# Patient Record
Sex: FEMALE | Race: WHITE | NOT HISPANIC OR LATINO
[De-identification: names, ages, dates, MRNs, and addresses within clinical notes are randomized per-mention and may not be internally consistent; named-entity substitution may affect disease eponyms.]

---

## 2022-05-04 ENCOUNTER — APPOINTMENT (OUTPATIENT)
Dept: INTERNAL MEDICINE | Facility: CLINIC | Age: 72
End: 2022-05-04
Payer: MEDICARE

## 2022-05-04 ENCOUNTER — NON-APPOINTMENT (OUTPATIENT)
Age: 72
End: 2022-05-04

## 2022-05-04 VITALS
WEIGHT: 122 LBS | SYSTOLIC BLOOD PRESSURE: 120 MMHG | HEART RATE: 100 BPM | HEIGHT: 63 IN | OXYGEN SATURATION: 97 % | BODY MASS INDEX: 21.62 KG/M2 | DIASTOLIC BLOOD PRESSURE: 70 MMHG | TEMPERATURE: 97.3 F

## 2022-05-04 DIAGNOSIS — Z82.5 FAMILY HISTORY OF ASTHMA AND OTHER CHRONIC LOWER RESPIRATORY DISEASES: ICD-10-CM

## 2022-05-04 DIAGNOSIS — Z00.00 ENCOUNTER FOR GENERAL ADULT MEDICAL EXAMINATION W/OUT ABNORMAL FINDINGS: ICD-10-CM

## 2022-05-04 DIAGNOSIS — Z79.4 TYPE 2 DIABETES MELLITUS WITH DIABETIC MACULAR EDEMA, RESOLVED FOLLOWING TREATMENT, BILATERAL: ICD-10-CM

## 2022-05-04 DIAGNOSIS — Z83.3 FAMILY HISTORY OF DIABETES MELLITUS: ICD-10-CM

## 2022-05-04 DIAGNOSIS — E11.37X3 TYPE 2 DIABETES MELLITUS WITH DIABETIC MACULAR EDEMA, RESOLVED FOLLOWING TREATMENT, BILATERAL: ICD-10-CM

## 2022-05-04 PROCEDURE — G0439: CPT

## 2022-05-04 PROCEDURE — 93000 ELECTROCARDIOGRAM COMPLETE: CPT

## 2022-05-04 NOTE — HEALTH RISK ASSESSMENT
[Very Good] : very good [No falls in past year] : Patient reported no falls in the past year [0] : 2) Feeling down, depressed, or hopeless: Not at all [PHQ-2 Score ___] : PHQ-2 Score [unfilled] [Reviewed and Current] :  reviewed and current [Handling Complex Tasks] : difficulty handling complex tasks [None] : None [With Family] : lives with family [Fully functional (bathing, dressing, toileting, transferring, walking, feeding)] : Fully functional (bathing, dressing, toileting, transferring, walking, feeding) [Fully functional (using the telephone, shopping, preparing meals, housekeeping, doing laundry, using transportation,] : Fully functional and needs no help or supervision to perform IADLs (using the telephone, shopping, preparing meals, housekeeping, doing laundry, using transportation, managing medications and managing finances) [Smoke Detector] : smoke detector [Carbon Monoxide Detector] : carbon monoxide detector [Safety elements used in home] : safety elements used in home [Seat Belt] :  uses seat belt [Sunscreen] : uses sunscreen [Active tobbaco user] : Patient is not a tobacco user [Change in mental status noted] : No change in mental status noted [Language] : denies difficulty with language [Behavior] : denies difficulty with behavior [Learning/Retaining New Information] : denies difficulty learning/retaining new information [Reasoning] : denies difficulty with reasoning [Spatial Ability and Orientation] : denies difficulty with spatial ability and orientation [Reports changes in hearing] : Reports no changes in hearing [Reports changes in vision] : Reports no changes in vision [Reports changes in dental health] : Reports no changes in dental health [Travel to Developing Areas] : does not  travel to developing areas [TB Exposure] : is not being exposed to tuberculosis [Caregiver Concerns] : does not have caregiver concerns [de-identified] : 3 years ago [de-identified] : Never dated [de-identified] : Never dated [de-identified] : Never dated

## 2022-05-04 NOTE — PLAN
[FreeTextEntry1] : 1.  Patient is a diabetic, she is on metformin and Levemir injections from prior physician also she admits that she takes Januvia diabetic teaching given to the patient patient already knows about finger sticks and testing the blood sugar monitoring the blood sugar at home, ordered Contour Next 1 test strips to use it 3 times a day. \par  #2 she is on cholesterol medication atorvastatin patient does not remember the dosage she thinks it might be 10 mg, lipid profile ordered to monitor patient's status of the cholesterol panel.\par #3 she has hyperthyroidism for which she takes methimazole 10 mg a day, she is running shot and prescription called in.  Also sonogram of the thyroid ordered to rule out nodules.\par #4 Endocrinology referral given out\par #5 gastroenterology referral for colonoscopy\par #6 mammogram ordered\par #7 bone density ordered #8 patient encouraged to see a gynecologist for a pelvic examination.\par \par Diet low in simple carbohydrates, rich in fiber, no trans fat and low dietary fat, if possible plant based healthy fats.\par good source of proteins, both plant and animal sources, try to avoid red meats if possible.\par sleep is very important at least 8 hours of restful sleep, if possible afternoon brief siesta, for people above 60 years of age.\par Exercise, both aerobic and weight training.\par Healthy habits like gardening, walking etc, both as exercise and hobby\par wear seat belts,\par Immunizations please pay attention to the age, season etc.\par Avoid drugs, alcohol and refrain from smoking.\par Know your risk factors ,go for screening tests.\par

## 2022-05-04 NOTE — HISTORY OF PRESENT ILLNESS
[FreeTextEntry1] : New patient is here for medication refills  [de-identified] :  Pt feels better at the time of exam, no fever , shaking chills no nausea, no vomiting, no diarrhoea, no constipation, no chest pain, no palpitations.Denies any cough or any other respiratory s/s.Not loosing lot of weight nor gaining excess amount of weight.No muscle aches, no joint pains, no skin rashes.No forgetfulness no difficulty with ADLs, Able to sleep, eat ,function and eliminate well.Not been to an emergency room or hospital in the last one year.

## 2022-06-09 ENCOUNTER — LABORATORY RESULT (OUTPATIENT)
Age: 72
End: 2022-06-09

## 2022-06-09 ENCOUNTER — APPOINTMENT (OUTPATIENT)
Dept: INTERNAL MEDICINE | Facility: CLINIC | Age: 72
End: 2022-06-09
Payer: MEDICARE

## 2022-06-09 VITALS
WEIGHT: 122 LBS | OXYGEN SATURATION: 97 % | TEMPERATURE: 97.1 F | HEART RATE: 108 BPM | HEIGHT: 63 IN | SYSTOLIC BLOOD PRESSURE: 100 MMHG | DIASTOLIC BLOOD PRESSURE: 60 MMHG | BODY MASS INDEX: 21.62 KG/M2

## 2022-06-09 PROCEDURE — 99213 OFFICE O/P EST LOW 20 MIN: CPT

## 2022-06-09 NOTE — HISTORY OF PRESENT ILLNESS
[FreeTextEntry8] : pt has uti, left knee pain, difficulty walking urinary symptoms started a week or 10 days ago progressively getting worse patient had UTI before and this feels like may be a UTI.

## 2022-06-09 NOTE — PLAN
[FreeTextEntry1] : Drink plenty of PO liquids,\par take the antibiotic as prescribed, do not miss the doses and make it all gone/finish the entire dose with out failure.\par Take azo , OTC its only symptomatic management, not treatment. As the s/s improve the Azo can be discontinued.\par Tylenol for the fever as needed\par Advil on a full stomach for back pain or general body aches.If the back pain is persistent or fever is persistent please call the office.\par Life style modification after the coitus suggested, to increase the general hygiene\par Maintain a dairy of UTIs, if frequent incidence of UTIs, may consider the antibiotic prophylaxis\par

## 2022-06-10 ENCOUNTER — LABORATORY RESULT (OUTPATIENT)
Age: 72
End: 2022-06-10

## 2022-06-10 LAB
APPEARANCE: ABNORMAL
BILIRUBIN URINE: NEGATIVE
BLOOD URINE: NORMAL
COLOR: YELLOW
GLUCOSE QUALITATIVE U: NEGATIVE
KETONES URINE: ABNORMAL
LEUKOCYTE ESTERASE URINE: ABNORMAL
NITRITE URINE: POSITIVE
PH URINE: 6
PROTEIN URINE: NORMAL
SPECIFIC GRAVITY URINE: >=1.03
UROBILINOGEN URINE: NORMAL

## 2022-07-11 ENCOUNTER — APPOINTMENT (OUTPATIENT)
Dept: PHYSICAL MEDICINE AND REHAB | Facility: CLINIC | Age: 72
End: 2022-07-11

## 2022-07-14 ENCOUNTER — APPOINTMENT (OUTPATIENT)
Dept: PHYSICAL MEDICINE AND REHAB | Facility: CLINIC | Age: 72
End: 2022-07-14

## 2022-07-14 NOTE — PHYSICAL EXAM
[FreeTextEntry1] : PE:\par Constitutional: In NAD, calm and cooperative\par MSK (Knees):\par 	Inspection: no gross swelling identified\par 	ROM: Pain at end knee flexion\par 	Palpation: Tenderness over medial and lateral joint lines\par 	Strength: 5/5 in bilateral lower extremities\par 	Reflexes: 2+ Patella reflex bilaterally, 1+ Achilles reflex bilaterally, negative clonus bilaterally\par 	Sensation: Intact to light touch in bilateral lower extremities\par Special tests: \par Valgus stress test:\par Varus stress test:\par Lachman’s test: \par Anterior drawer test:\par Posterior drawer test:\par Patella grinding test:

## 2022-07-14 NOTE — HISTORY OF PRESENT ILLNESS
[FreeTextEntry1] : Ms. RAFY ROMANO is a 72 year year old female who presents with knee pain. \par \par Location:L knee\par Onset:\par Provocation/Palliative:\par Quality:\par Radiation:\par Severity:\par Timing:\par \par Denies any associated numbness. Admits to some instability but denies any recent falls. \par Previous medications trialed:\par Previous procedures relevant to complaint:\par Has tried conservative therapy?:\par

## 2022-07-21 ENCOUNTER — APPOINTMENT (OUTPATIENT)
Dept: ENDOCRINOLOGY | Facility: CLINIC | Age: 72
End: 2022-07-21

## 2022-07-21 VITALS
HEIGHT: 63 IN | RESPIRATION RATE: 16 BRPM | WEIGHT: 120 LBS | TEMPERATURE: 98 F | DIASTOLIC BLOOD PRESSURE: 72 MMHG | SYSTOLIC BLOOD PRESSURE: 116 MMHG | BODY MASS INDEX: 21.26 KG/M2 | OXYGEN SATURATION: 99 % | HEART RATE: 102 BPM

## 2022-07-21 LAB — HBA1C MFR BLD HPLC: 6.8

## 2022-07-21 PROCEDURE — 99205 OFFICE O/P NEW HI 60 MIN: CPT | Mod: 25

## 2022-07-21 PROCEDURE — 83036 HEMOGLOBIN GLYCOSYLATED A1C: CPT | Mod: QW

## 2022-07-21 PROCEDURE — 36415 COLL VENOUS BLD VENIPUNCTURE: CPT

## 2022-07-21 RX ORDER — CIPROFLOXACIN HYDROCHLORIDE 500 MG/1
500 TABLET, FILM COATED ORAL TWICE DAILY
Qty: 14 | Refills: 0 | Status: COMPLETED | COMMUNITY
Start: 2022-06-09 | End: 2022-07-21

## 2022-07-21 RX ORDER — SITAGLIPTIN 100 MG/1
100 TABLET, FILM COATED ORAL
Refills: 0 | Status: COMPLETED | COMMUNITY
End: 2022-07-21

## 2022-07-21 RX ORDER — PHENAZOPYRIDINE 100 MG/1
100 TABLET, FILM COATED ORAL 3 TIMES DAILY
Qty: 30 | Refills: 0 | Status: COMPLETED | COMMUNITY
Start: 2022-06-09 | End: 2022-07-21

## 2022-07-28 ENCOUNTER — APPOINTMENT (OUTPATIENT)
Dept: ULTRASOUND IMAGING | Facility: CLINIC | Age: 72
End: 2022-07-28

## 2022-08-03 ENCOUNTER — NON-APPOINTMENT (OUTPATIENT)
Age: 72
End: 2022-08-03

## 2022-08-04 LAB
25(OH)D3 SERPL-MCNC: 38.1 NG/ML
ALBUMIN SERPL ELPH-MCNC: 4.4 G/DL
ALP BLD-CCNC: 83 U/L
ALT SERPL-CCNC: 13 U/L
ANION GAP SERPL CALC-SCNC: 15 MMOL/L
AST SERPL-CCNC: 19 U/L
BASOPHILS # BLD AUTO: 0.02 K/UL
BASOPHILS NFR BLD AUTO: 0.3 %
BILIRUB SERPL-MCNC: 0.2 MG/DL
BUN SERPL-MCNC: 15 MG/DL
CALCIUM SERPL-MCNC: 9.2 MG/DL
CHLORIDE SERPL-SCNC: 103 MMOL/L
CHOLEST SERPL-MCNC: 130 MG/DL
CO2 SERPL-SCNC: 22 MMOL/L
CREAT SERPL-MCNC: 0.81 MG/DL
CREAT SPEC-SCNC: 73 MG/DL
EGFR: 77 ML/MIN/1.73M2
EOSINOPHIL # BLD AUTO: 0.23 K/UL
EOSINOPHIL NFR BLD AUTO: 3.8 %
GLUCOSE SERPL-MCNC: 152 MG/DL
HCT VFR BLD CALC: 39.4 %
HDLC SERPL-MCNC: 45 MG/DL
HGB BLD-MCNC: 12.6 G/DL
IMM GRANULOCYTES NFR BLD AUTO: 0.2 %
LDLC SERPL CALC-MCNC: 53 MG/DL
LYMPHOCYTES # BLD AUTO: 1.34 K/UL
LYMPHOCYTES NFR BLD AUTO: 21.9 %
MAN DIFF?: NORMAL
MCHC RBC-ENTMCNC: 30.1 PG
MCHC RBC-ENTMCNC: 32 GM/DL
MCV RBC AUTO: 94 FL
MICROALBUMIN 24H UR DL<=1MG/L-MCNC: <1.2 MG/DL
MICROALBUMIN/CREAT 24H UR-RTO: NORMAL MG/G
MONOCYTES # BLD AUTO: 0.47 K/UL
MONOCYTES NFR BLD AUTO: 7.7 %
NEUTROPHILS # BLD AUTO: 4.04 K/UL
NEUTROPHILS NFR BLD AUTO: 66.1 %
NONHDLC SERPL-MCNC: 85 MG/DL
PLATELET # BLD AUTO: 233 K/UL
POTASSIUM SERPL-SCNC: 4.2 MMOL/L
PROT SERPL-MCNC: 7.2 G/DL
RBC # BLD: 4.19 M/UL
RBC # FLD: 13.3 %
SODIUM SERPL-SCNC: 140 MMOL/L
T3 SERPL-MCNC: 109 NG/DL
T4 FREE SERPL-MCNC: 1.4 NG/DL
THYROGLOB AB SERPL-ACNC: <20 IU/ML
THYROPEROXIDASE AB SERPL IA-ACNC: 16.1 IU/ML
TRIGL SERPL-MCNC: 162 MG/DL
TSH SERPL-ACNC: 1.79 UIU/ML
TSI ACT/NOR SER: <0.1 IU/L
VIT B12 SERPL-MCNC: 800 PG/ML
WBC # FLD AUTO: 6.11 K/UL

## 2022-08-17 ENCOUNTER — APPOINTMENT (OUTPATIENT)
Dept: PHYSICAL MEDICINE AND REHAB | Facility: CLINIC | Age: 72
End: 2022-08-17

## 2022-08-18 ENCOUNTER — APPOINTMENT (OUTPATIENT)
Dept: INTERNAL MEDICINE | Facility: CLINIC | Age: 72
End: 2022-08-18

## 2022-08-18 VITALS
WEIGHT: 118 LBS | BODY MASS INDEX: 20.91 KG/M2 | OXYGEN SATURATION: 98 % | DIASTOLIC BLOOD PRESSURE: 72 MMHG | HEART RATE: 97 BPM | SYSTOLIC BLOOD PRESSURE: 126 MMHG | TEMPERATURE: 97.8 F | HEIGHT: 63 IN

## 2022-08-18 DIAGNOSIS — Z12.39 ENCOUNTER FOR OTHER SCREENING FOR MALIGNANT NEOPLASM OF BREAST: ICD-10-CM

## 2022-08-18 PROCEDURE — 99214 OFFICE O/P EST MOD 30 MIN: CPT

## 2022-09-13 ENCOUNTER — APPOINTMENT (OUTPATIENT)
Dept: RADIOLOGY | Facility: CLINIC | Age: 72
End: 2022-09-13

## 2022-09-13 ENCOUNTER — OUTPATIENT (OUTPATIENT)
Dept: OUTPATIENT SERVICES | Facility: HOSPITAL | Age: 72
LOS: 1 days | End: 2022-09-13
Payer: COMMERCIAL

## 2022-09-13 DIAGNOSIS — Z12.39 ENCOUNTER FOR OTHER SCREENING FOR MALIGNANT NEOPLASM OF BREAST: ICD-10-CM

## 2022-09-13 PROCEDURE — 77080 DXA BONE DENSITY AXIAL: CPT | Mod: 26

## 2022-09-13 PROCEDURE — 77080 DXA BONE DENSITY AXIAL: CPT

## 2022-11-01 ENCOUNTER — RX RENEWAL (OUTPATIENT)
Age: 72
End: 2022-11-01

## 2022-11-01 RX ORDER — PEN NEEDLE, DIABETIC 32GX 5/32"
32G X 4 MM NEEDLE, DISPOSABLE MISCELLANEOUS
Qty: 100 | Refills: 10 | Status: ACTIVE | COMMUNITY
Start: 2022-11-01 | End: 1900-01-01

## 2022-11-17 ENCOUNTER — APPOINTMENT (OUTPATIENT)
Dept: INTERNAL MEDICINE | Facility: CLINIC | Age: 72
End: 2022-11-17

## 2022-11-17 PROCEDURE — G0008: CPT

## 2022-11-17 PROCEDURE — 90662 IIV NO PRSV INCREASED AG IM: CPT

## 2022-11-18 ENCOUNTER — MED ADMIN CHARGE (OUTPATIENT)
Age: 72
End: 2022-11-18

## 2022-11-29 ENCOUNTER — APPOINTMENT (OUTPATIENT)
Dept: ENDOCRINOLOGY | Facility: CLINIC | Age: 72
End: 2022-11-29

## 2022-11-30 ENCOUNTER — RX RENEWAL (OUTPATIENT)
Age: 72
End: 2022-11-30

## 2022-12-07 ENCOUNTER — RX RENEWAL (OUTPATIENT)
Age: 72
End: 2022-12-07

## 2023-01-26 ENCOUNTER — OUTPATIENT (OUTPATIENT)
Dept: OUTPATIENT SERVICES | Facility: HOSPITAL | Age: 73
LOS: 1 days | End: 2023-01-26
Payer: COMMERCIAL

## 2023-01-26 ENCOUNTER — LABORATORY RESULT (OUTPATIENT)
Age: 73
End: 2023-01-26

## 2023-01-26 ENCOUNTER — APPOINTMENT (OUTPATIENT)
Dept: RADIOLOGY | Facility: CLINIC | Age: 73
End: 2023-01-26
Payer: MEDICARE

## 2023-01-26 ENCOUNTER — APPOINTMENT (OUTPATIENT)
Dept: INTERNAL MEDICINE | Facility: CLINIC | Age: 73
End: 2023-01-26
Payer: MEDICARE

## 2023-01-26 VITALS
TEMPERATURE: 98.2 F | HEART RATE: 94 BPM | DIASTOLIC BLOOD PRESSURE: 68 MMHG | SYSTOLIC BLOOD PRESSURE: 112 MMHG | HEIGHT: 63 IN | OXYGEN SATURATION: 98 % | BODY MASS INDEX: 21.62 KG/M2 | WEIGHT: 122 LBS

## 2023-01-26 DIAGNOSIS — Z00.00 ENCOUNTER FOR GENERAL ADULT MEDICAL EXAMINATION WITHOUT ABNORMAL FINDINGS: ICD-10-CM

## 2023-01-26 PROCEDURE — 71046 X-RAY EXAM CHEST 2 VIEWS: CPT | Mod: 26

## 2023-01-26 PROCEDURE — 71046 X-RAY EXAM CHEST 2 VIEWS: CPT

## 2023-01-26 PROCEDURE — 99213 OFFICE O/P EST LOW 20 MIN: CPT | Mod: 25

## 2023-01-26 NOTE — PLAN
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  I did not advise INH prophylaxis due to , Lack of patient clinical signs and symptoms of active pulmonary  TB and  patient's age, for the fear of hepatic toxicity of INH.  If you have any questions regarding this please feel free to contact me at [phone].  We can easily obtain an infectious diseases consultation with the specialist.\par \par MMR titers ordered and blood drawn today and are pending today

## 2023-01-26 NOTE — HISTORY OF PRESENT ILLNESS
[Other: _____] : [unfilled] [FreeTextEntry1] : physical for work [de-identified] :  Pt feels better at the time of exam, no fever , shaking chills no nausea, no vomiting, no diarrhoea, no constipation, no chest pain, no palpitations.Denies any cough or any other respiratory s/s.Not loosing lot of weight nor gaining excess amount of weight.No muscle aches, no joint pains, no skin rashes.No forgetfulness no difficulty with ADLs, Able to sleep, eat ,function and eliminate well.Not been to an emergency room or hospital in the last one year. vaccinated for covid with both vaccinations.offers no complaints.\par

## 2023-01-30 ENCOUNTER — RX RENEWAL (OUTPATIENT)
Age: 73
End: 2023-01-30

## 2023-01-30 LAB
ALBUMIN SERPL ELPH-MCNC: 4.5 G/DL
ALP BLD-CCNC: 87 U/L
ALT SERPL-CCNC: 14 U/L
ANION GAP SERPL CALC-SCNC: 12 MMOL/L
APPEARANCE: ABNORMAL
AST SERPL-CCNC: 22 U/L
BASOPHILS # BLD AUTO: 0.04 K/UL
BASOPHILS NFR BLD AUTO: 0.7 %
BILIRUB SERPL-MCNC: <0.2 MG/DL
BILIRUBIN URINE: NEGATIVE
BLOOD URINE: NEGATIVE
BUN SERPL-MCNC: 17 MG/DL
CALCIUM SERPL-MCNC: 9.6 MG/DL
CHLORIDE SERPL-SCNC: 100 MMOL/L
CHOLEST SERPL-MCNC: 127 MG/DL
CO2 SERPL-SCNC: 28 MMOL/L
COLOR: YELLOW
CREAT SERPL-MCNC: 0.88 MG/DL
EGFR: 69 ML/MIN/1.73M2
EOSINOPHIL # BLD AUTO: 0.46 K/UL
EOSINOPHIL NFR BLD AUTO: 7.5 %
ESTIMATED AVERAGE GLUCOSE: 177 MG/DL
GLUCOSE QUALITATIVE U: NEGATIVE
GLUCOSE SERPL-MCNC: 111 MG/DL
HBA1C MFR BLD HPLC: 7.8 %
HCT VFR BLD CALC: 37.4 %
HDLC SERPL-MCNC: 46 MG/DL
HGB BLD-MCNC: 12.2 G/DL
IMM GRANULOCYTES NFR BLD AUTO: 0.2 %
KETONES URINE: NEGATIVE
LDLC SERPL CALC-MCNC: 46 MG/DL
LEUKOCYTE ESTERASE URINE: ABNORMAL
LYMPHOCYTES # BLD AUTO: 1.5 K/UL
LYMPHOCYTES NFR BLD AUTO: 24.4 %
MAN DIFF?: NORMAL
MCHC RBC-ENTMCNC: 29.5 PG
MCHC RBC-ENTMCNC: 32.6 GM/DL
MCV RBC AUTO: 90.6 FL
MEV IGG FLD QL IA: >300 AU/ML
MEV IGG+IGM SER-IMP: POSITIVE
MONOCYTES # BLD AUTO: 0.5 K/UL
MONOCYTES NFR BLD AUTO: 8.1 %
MUV AB SER-ACNC: POSITIVE
MUV IGG SER QL IA: 41.8 AU/ML
NEUTROPHILS # BLD AUTO: 3.64 K/UL
NEUTROPHILS NFR BLD AUTO: 59.1 %
NITRITE URINE: NEGATIVE
NONHDLC SERPL-MCNC: 81 MG/DL
PH URINE: 7.5
PLATELET # BLD AUTO: 249 K/UL
POTASSIUM SERPL-SCNC: 4.7 MMOL/L
PROT SERPL-MCNC: 7.3 G/DL
PROTEIN URINE: NORMAL
RBC # BLD: 4.13 M/UL
RBC # FLD: 12.4 %
RUBV IGG FLD-ACNC: 6 INDEX
RUBV IGG SER-IMP: POSITIVE
SODIUM SERPL-SCNC: 140 MMOL/L
SPECIFIC GRAVITY URINE: 1.02
TRIGL SERPL-MCNC: 178 MG/DL
TSH SERPL-ACNC: 1.47 UIU/ML
UROBILINOGEN URINE: NORMAL
VZV AB TITR SER: POSITIVE
VZV IGG SER IF-ACNC: 3330 INDEX
WBC # FLD AUTO: 6.15 K/UL

## 2023-02-02 ENCOUNTER — APPOINTMENT (OUTPATIENT)
Dept: INTERNAL MEDICINE | Facility: CLINIC | Age: 73
End: 2023-02-02

## 2023-02-09 ENCOUNTER — APPOINTMENT (OUTPATIENT)
Dept: INTERNAL MEDICINE | Facility: CLINIC | Age: 73
End: 2023-02-09
Payer: MEDICARE

## 2023-02-09 VITALS
HEART RATE: 96 BPM | DIASTOLIC BLOOD PRESSURE: 60 MMHG | WEIGHT: 122 LBS | OXYGEN SATURATION: 96 % | SYSTOLIC BLOOD PRESSURE: 120 MMHG | BODY MASS INDEX: 21.61 KG/M2 | TEMPERATURE: 98 F

## 2023-02-09 DIAGNOSIS — N39.0 URINARY TRACT INFECTION, SITE NOT SPECIFIED: ICD-10-CM

## 2023-02-09 PROCEDURE — 99214 OFFICE O/P EST MOD 30 MIN: CPT

## 2023-02-15 PROBLEM — N39.0 RECURRENT UTI (URINARY TRACT INFECTION): Status: ACTIVE | Noted: 2022-06-09

## 2023-02-15 NOTE — HISTORY OF PRESENT ILLNESS
[Other: _____] : [unfilled] [FreeTextEntry1] : Patient here for a follow up. [de-identified] : Pt. increased the levmir to 20 units, blood sugar seems to be under merry control.  Patient hemoglobin A1c went up to 7.8 x 1 point and Levemir was increased, blood sugars doing better\par \par  Pt feels better at the time of exam, no fever , shaking chills no nausea, no vomiting, no diarrhoea, no constipation, no chest pain, no palpitations.Denies any cough or any other respiratory s/s.Not loosing lot of weight nor gaining excess amount of weight.No muscle aches, no joint pains, no skin rashes.No forgetfulness no difficulty with ADLs, Able to sleep, eat ,function and eliminate well.Not been to an emergency room or hospital in the last one year.

## 2023-02-17 ENCOUNTER — RX RENEWAL (OUTPATIENT)
Age: 73
End: 2023-02-17

## 2023-02-17 RX ORDER — INSULIN DETEMIR 100 [IU]/ML
100 INJECTION, SOLUTION SUBCUTANEOUS
Qty: 4 | Refills: 5 | Status: ACTIVE | COMMUNITY
Start: 2022-10-06 | End: 1900-01-01

## 2023-03-23 ENCOUNTER — RX RENEWAL (OUTPATIENT)
Age: 73
End: 2023-03-23

## 2023-05-19 ENCOUNTER — LABORATORY RESULT (OUTPATIENT)
Age: 73
End: 2023-05-19

## 2023-05-19 ENCOUNTER — APPOINTMENT (OUTPATIENT)
Dept: INTERNAL MEDICINE | Facility: CLINIC | Age: 73
End: 2023-05-19
Payer: MEDICARE

## 2023-05-19 VITALS
HEART RATE: 93 BPM | WEIGHT: 122 LBS | TEMPERATURE: 97.1 F | DIASTOLIC BLOOD PRESSURE: 82 MMHG | SYSTOLIC BLOOD PRESSURE: 124 MMHG | OXYGEN SATURATION: 98 % | BODY MASS INDEX: 21.62 KG/M2 | HEIGHT: 63 IN

## 2023-05-19 PROCEDURE — 99214 OFFICE O/P EST MOD 30 MIN: CPT

## 2023-05-19 NOTE — HISTORY OF PRESENT ILLNESS
[FreeTextEntry8] : c/o blood sugar always higher\par incontinence\par \par  Pt feels better at the time of exam, no fever , shaking chills no nausea, no vomiting, no diarrhoea, no constipation, no chest pain, no palpitations.Denies any cough or any other respiratory s/s.Not loosing lot of weight nor gaining excess amount of weight.No muscle aches, no joint pains, no skin rashes.No forgetfulness no difficulty with ADLs, Able to sleep, eat ,function and eliminate well.Not been to an emergency room or hospital in the last one year. vaccinated for covid with both vaccinations.offers no complaints.\par  [Other: _____] : [unfilled]

## 2023-05-25 LAB
ALBUMIN SERPL ELPH-MCNC: 4.4 G/DL
ALP BLD-CCNC: 88 U/L
ALT SERPL-CCNC: 15 U/L
ANION GAP SERPL CALC-SCNC: 14 MMOL/L
APPEARANCE: ABNORMAL
AST SERPL-CCNC: 21 U/L
BILIRUB SERPL-MCNC: 0.3 MG/DL
BILIRUBIN URINE: NEGATIVE
BLOOD URINE: ABNORMAL
BUN SERPL-MCNC: 20 MG/DL
CALCIUM SERPL-MCNC: 9.6 MG/DL
CHLORIDE SERPL-SCNC: 101 MMOL/L
CHOLEST SERPL-MCNC: 121 MG/DL
CO2 SERPL-SCNC: 25 MMOL/L
COLOR: YELLOW
CREAT SERPL-MCNC: 0.85 MG/DL
EGFR: 72 ML/MIN/1.73M2
ESTIMATED AVERAGE GLUCOSE: 203 MG/DL
GLUCOSE QUALITATIVE U: 100 MG/DL
GLUCOSE SERPL-MCNC: 82 MG/DL
HBA1C MFR BLD HPLC: 8.7 %
HDLC SERPL-MCNC: 48 MG/DL
KETONES URINE: NEGATIVE MG/DL
LDLC SERPL CALC-MCNC: 55 MG/DL
LEUKOCYTE ESTERASE URINE: ABNORMAL
NITRITE URINE: NEGATIVE
NONHDLC SERPL-MCNC: 74 MG/DL
PH URINE: 7
POTASSIUM SERPL-SCNC: 4.1 MMOL/L
PROT SERPL-MCNC: 7 G/DL
PROTEIN URINE: NEGATIVE MG/DL
SODIUM SERPL-SCNC: 140 MMOL/L
SPECIFIC GRAVITY URINE: 1.02
TRIGL SERPL-MCNC: 92 MG/DL
TSH SERPL-ACNC: 2.86 UIU/ML
UROBILINOGEN URINE: 0.2 MG/DL

## 2023-06-22 ENCOUNTER — RX RENEWAL (OUTPATIENT)
Age: 73
End: 2023-06-22

## 2023-06-22 RX ORDER — METHIMAZOLE 10 MG/1
10 TABLET ORAL DAILY
Qty: 30 | Refills: 10 | Status: ACTIVE | COMMUNITY
Start: 2022-05-04 | End: 1900-01-01

## 2023-06-29 RX ORDER — BLOOD SUGAR DIAGNOSTIC
STRIP MISCELLANEOUS 3 TIMES DAILY
Qty: 300 | Refills: 5 | Status: ACTIVE | COMMUNITY
Start: 2022-05-04

## 2023-06-30 ENCOUNTER — APPOINTMENT (OUTPATIENT)
Dept: INTERNAL MEDICINE | Facility: CLINIC | Age: 73
End: 2023-06-30
Payer: MEDICARE

## 2023-06-30 VITALS
DIASTOLIC BLOOD PRESSURE: 72 MMHG | SYSTOLIC BLOOD PRESSURE: 122 MMHG | WEIGHT: 122 LBS | HEART RATE: 106 BPM | TEMPERATURE: 98.5 F | BODY MASS INDEX: 21.61 KG/M2 | OXYGEN SATURATION: 96 %

## 2023-06-30 DIAGNOSIS — R35.0 FREQUENCY OF MICTURITION: ICD-10-CM

## 2023-06-30 DIAGNOSIS — R30.0 DYSURIA: ICD-10-CM

## 2023-06-30 DIAGNOSIS — N39.0 URINARY TRACT INFECTION, SITE NOT SPECIFIED: ICD-10-CM

## 2023-06-30 PROCEDURE — 99214 OFFICE O/P EST MOD 30 MIN: CPT | Mod: 25

## 2023-06-30 PROCEDURE — 81002 URINALYSIS NONAUTO W/O SCOPE: CPT

## 2023-06-30 NOTE — PHYSICAL EXAM
[No Acute Distress] : no acute distress [Well Nourished] : well nourished [Well Developed] : well developed [Well-Appearing] : well-appearing [Normal Sclera/Conjunctiva] : normal sclera/conjunctiva [PERRL] : pupils equal round and reactive to light [EOMI] : extraocular movements intact [Normal Outer Ear/Nose] : the outer ears and nose were normal in appearance [Normal Oropharynx] : the oropharynx was normal [No JVD] : no jugular venous distention [No Lymphadenopathy] : no lymphadenopathy [Supple] : supple [Thyroid Normal, No Nodules] : the thyroid was normal and there were no nodules present [No Respiratory Distress] : no respiratory distress  [No Accessory Muscle Use] : no accessory muscle use [Clear to Auscultation] : lungs were clear to auscultation bilaterally [Normal Rate] : normal rate  [Regular Rhythm] : with a regular rhythm [Normal S1, S2] : normal S1 and S2 [No Murmur] : no murmur heard [No Carotid Bruits] : no carotid bruits [No Abdominal Bruit] : a ~M bruit was not heard ~T in the abdomen [No Varicosities] : no varicosities [Pedal Pulses Present] : the pedal pulses are present [No Edema] : there was no peripheral edema [No Palpable Aorta] : no palpable aorta [No Extremity Clubbing/Cyanosis] : no extremity clubbing/cyanosis [Soft] : abdomen soft [Non Tender] : non-tender [Non-distended] : non-distended [No Masses] : no abdominal mass palpated [No HSM] : no HSM [Normal Bowel Sounds] : normal bowel sounds [Normal Posterior Cervical Nodes] : no posterior cervical lymphadenopathy [Normal Anterior Cervical Nodes] : no anterior cervical lymphadenopathy [No CVA Tenderness] : no CVA  tenderness [No Spinal Tenderness] : no spinal tenderness [No Joint Swelling] : no joint swelling [Grossly Normal Strength/Tone] : grossly normal strength/tone [No Rash] : no rash [Coordination Grossly Intact] : coordination grossly intact [No Focal Deficits] : no focal deficits [Normal Gait] : normal gait [Deep Tendon Reflexes (DTR)] : deep tendon reflexes were 2+ and symmetric [Normal Affect] : the affect was normal [Normal Insight/Judgement] : insight and judgment were intact [de-identified] : No tenderness on the pelvic area of the abdomen

## 2023-06-30 NOTE — HISTORY OF PRESENT ILLNESS
[Other: _____] : [unfilled] [FreeTextEntry8] : 73 year old female patient comes with\par  complaints of Possiblble UTI\par Increased frequency of urination+\par Complains of dysuria\par No fever\par No shaking chills\par No visible blood in the urine\par Denies similar complaints in the last 1 month\par \par

## 2023-08-04 ENCOUNTER — RX RENEWAL (OUTPATIENT)
Age: 73
End: 2023-08-04

## 2023-08-09 RX ORDER — INSULIN DETEMIR 100 [IU]/ML
100 INJECTION, SOLUTION SUBCUTANEOUS
Qty: 4 | Refills: 5 | Status: ACTIVE | COMMUNITY
Start: 2023-03-02 | End: 1900-01-01

## 2023-08-23 DIAGNOSIS — Z12.11 ENCOUNTER FOR SCREENING FOR MALIGNANT NEOPLASM OF COLON: ICD-10-CM

## 2023-08-30 ENCOUNTER — APPOINTMENT (OUTPATIENT)
Dept: INTERNAL MEDICINE | Facility: CLINIC | Age: 73
End: 2023-08-30
Payer: MEDICARE

## 2023-08-30 VITALS
WEIGHT: 122 LBS | HEART RATE: 88 BPM | TEMPERATURE: 98.2 F | BODY MASS INDEX: 21.61 KG/M2 | SYSTOLIC BLOOD PRESSURE: 122 MMHG | DIASTOLIC BLOOD PRESSURE: 72 MMHG | OXYGEN SATURATION: 97 %

## 2023-08-30 DIAGNOSIS — M25.562 PAIN IN LEFT KNEE: ICD-10-CM

## 2023-08-30 PROCEDURE — 99213 OFFICE O/P EST LOW 20 MIN: CPT | Mod: 25

## 2023-08-30 NOTE — PLAN
[FreeTextEntry1] : TB- quantiferon test done  MMR test done  paperwork will be filled   Pt. explained.

## 2023-08-30 NOTE — HISTORY OF PRESENT ILLNESS
[Other: _____] : [unfilled] [de-identified] :  Pt feels better at the time of exam, no fever , shaking chills no nausea, no vomiting, no diarrhoea, no constipation, no chest pain, no palpitations.Denies any cough or any other respiratory s/s.Not loosing lot of weight nor gaining excess amount of weight.No muscle aches, no joint pains, no skin rashes.No forgetfulness no difficulty with ADLs, Able to sleep, eat ,function and eliminate well.Not been to an emergency room or hospital in the last one year. vaccinated for covid with both vaccinations.offers no complaints. [FreeTextEntry1] : 73 years old female PT here for a a follow up and paperwork fill

## 2023-09-01 LAB
MEV IGG FLD QL IA: >300 AU/ML
MEV IGG+IGM SER-IMP: POSITIVE
MUV AB SER-ACNC: POSITIVE
MUV IGG SER QL IA: 53.5 AU/ML
RUBV IGG FLD-ACNC: 5.7 INDEX
RUBV IGG SER-IMP: POSITIVE

## 2023-09-06 LAB
M TB IFN-G BLD-IMP: NEGATIVE
QUANTIFERON TB PLUS MITOGEN MINUS NIL: 7.43 IU/ML
QUANTIFERON TB PLUS NIL: 0.22 IU/ML
QUANTIFERON TB PLUS TB1 MINUS NIL: -0.01 IU/ML
QUANTIFERON TB PLUS TB2 MINUS NIL: 0.07 IU/ML

## 2023-10-04 ENCOUNTER — APPOINTMENT (OUTPATIENT)
Dept: INTERNAL MEDICINE | Facility: CLINIC | Age: 73
End: 2023-10-04

## 2023-10-04 ENCOUNTER — APPOINTMENT (OUTPATIENT)
Dept: FAMILY MEDICINE | Facility: CLINIC | Age: 73
End: 2023-10-04
Payer: MEDICARE

## 2023-10-04 VITALS
BODY MASS INDEX: 21.61 KG/M2 | DIASTOLIC BLOOD PRESSURE: 58 MMHG | WEIGHT: 122 LBS | HEART RATE: 122 BPM | TEMPERATURE: 98.9 F | OXYGEN SATURATION: 97 % | SYSTOLIC BLOOD PRESSURE: 118 MMHG

## 2023-10-04 VITALS — HEART RATE: 97 BPM

## 2023-10-04 DIAGNOSIS — M54.50 LOW BACK PAIN, UNSPECIFIED: ICD-10-CM

## 2023-10-04 DIAGNOSIS — Z23 ENCOUNTER FOR IMMUNIZATION: ICD-10-CM

## 2023-10-04 DIAGNOSIS — M54.9 DORSALGIA, UNSPECIFIED: ICD-10-CM

## 2023-10-04 PROCEDURE — 99203 OFFICE O/P NEW LOW 30 MIN: CPT | Mod: 25

## 2023-10-04 PROCEDURE — G0008: CPT

## 2023-10-04 PROCEDURE — 90662 IIV NO PRSV INCREASED AG IM: CPT

## 2023-10-04 RX ORDER — CIPROFLOXACIN HYDROCHLORIDE 500 MG/1
500 TABLET, FILM COATED ORAL TWICE DAILY
Qty: 14 | Refills: 0 | Status: DISCONTINUED | COMMUNITY
Start: 2023-06-30 | End: 2023-10-04

## 2023-10-20 ENCOUNTER — RX RENEWAL (OUTPATIENT)
Age: 73
End: 2023-10-20

## 2023-11-21 ENCOUNTER — APPOINTMENT (OUTPATIENT)
Dept: ENDOCRINOLOGY | Facility: CLINIC | Age: 73
End: 2023-11-21

## 2023-11-28 ENCOUNTER — APPOINTMENT (OUTPATIENT)
Dept: FAMILY MEDICINE | Facility: CLINIC | Age: 73
End: 2023-11-28
Payer: MEDICARE

## 2023-11-28 ENCOUNTER — NON-APPOINTMENT (OUTPATIENT)
Age: 73
End: 2023-11-28

## 2023-11-28 VITALS
HEART RATE: 97 BPM | SYSTOLIC BLOOD PRESSURE: 120 MMHG | WEIGHT: 118 LBS | OXYGEN SATURATION: 98 % | TEMPERATURE: 98 F | BODY MASS INDEX: 20.9 KG/M2 | DIASTOLIC BLOOD PRESSURE: 70 MMHG

## 2023-11-28 DIAGNOSIS — R42 DIZZINESS AND GIDDINESS: ICD-10-CM

## 2023-11-28 LAB — GLUCOSE BLDC GLUCOMTR-MCNC: 146

## 2023-11-28 PROCEDURE — 36415 COLL VENOUS BLD VENIPUNCTURE: CPT

## 2023-11-28 PROCEDURE — 99214 OFFICE O/P EST MOD 30 MIN: CPT | Mod: 25

## 2023-11-28 PROCEDURE — 82962 GLUCOSE BLOOD TEST: CPT

## 2023-11-30 LAB
25(OH)D3 SERPL-MCNC: 36.8 NG/ML
ALBUMIN SERPL ELPH-MCNC: 4.2 G/DL
ALP BLD-CCNC: 87 U/L
ALT SERPL-CCNC: 11 U/L
ANION GAP SERPL CALC-SCNC: 14 MMOL/L
APPEARANCE: CLEAR
AST SERPL-CCNC: 17 U/L
BACTERIA: NEGATIVE /HPF
BASOPHILS # BLD AUTO: 0.03 K/UL
BASOPHILS NFR BLD AUTO: 0.5 %
BILIRUB SERPL-MCNC: 0.2 MG/DL
BILIRUBIN URINE: NEGATIVE
BLOOD URINE: NEGATIVE
BUN SERPL-MCNC: 25 MG/DL
CALCIUM OXALATE CRYSTALS: PRESENT
CALCIUM SERPL-MCNC: 9.3 MG/DL
CAST: 2 /LPF
CHLORIDE SERPL-SCNC: 100 MMOL/L
CHOLEST SERPL-MCNC: 142 MG/DL
CO2 SERPL-SCNC: 24 MMOL/L
COLOR: YELLOW
CREAT SERPL-MCNC: 0.76 MG/DL
EGFR: 83 ML/MIN/1.73M2
EOSINOPHIL # BLD AUTO: 0.14 K/UL
EOSINOPHIL NFR BLD AUTO: 2.3 %
EPITHELIAL CELLS: 8 /HPF
ESTIMATED AVERAGE GLUCOSE: 146 MG/DL
GLUCOSE QUALITATIVE U: NEGATIVE MG/DL
GLUCOSE SERPL-MCNC: 149 MG/DL
HBA1C MFR BLD HPLC: 6.7 %
HCT VFR BLD CALC: 37 %
HDLC SERPL-MCNC: 46 MG/DL
HGB BLD-MCNC: 12 G/DL
HYALINE CASTS: PRESENT
IMM GRANULOCYTES NFR BLD AUTO: 0.2 %
KETONES URINE: ABNORMAL MG/DL
LDLC SERPL CALC-MCNC: 75 MG/DL
LEUKOCYTE ESTERASE URINE: ABNORMAL
LYMPHOCYTES # BLD AUTO: 1.56 K/UL
LYMPHOCYTES NFR BLD AUTO: 25.2 %
MAN DIFF?: NORMAL
MCHC RBC-ENTMCNC: 29.4 PG
MCHC RBC-ENTMCNC: 32.4 GM/DL
MCV RBC AUTO: 90.7 FL
MICROSCOPIC-UA: NORMAL
MONOCYTES # BLD AUTO: 0.46 K/UL
MONOCYTES NFR BLD AUTO: 7.4 %
NEUTROPHILS # BLD AUTO: 4 K/UL
NEUTROPHILS NFR BLD AUTO: 64.4 %
NITRITE URINE: NEGATIVE
NONHDLC SERPL-MCNC: 96 MG/DL
PH URINE: 5.5
PLATELET # BLD AUTO: 259 K/UL
POTASSIUM SERPL-SCNC: 3.9 MMOL/L
PROT SERPL-MCNC: 6.8 G/DL
PROTEIN URINE: NORMAL MG/DL
RBC # BLD: 4.08 M/UL
RBC # FLD: 13.2 %
RED BLOOD CELLS URINE: 4 /HPF
REVIEW: NORMAL
SODIUM SERPL-SCNC: 138 MMOL/L
SPECIFIC GRAVITY URINE: 1.02
T3 SERPL-MCNC: 115 NG/DL
T3FREE SERPL-MCNC: 3.02 PG/ML
T4 FREE SERPL-MCNC: 1 NG/DL
T4 SERPL-MCNC: 6.9 UG/DL
THYROGLOB AB SERPL-ACNC: <20 IU/ML
THYROPEROXIDASE AB SERPL IA-ACNC: 19.2 IU/ML
TRIGL SERPL-MCNC: 117 MG/DL
TSH SERPL-ACNC: 3.79 UIU/ML
UROBILINOGEN URINE: 0.2 MG/DL
WBC # FLD AUTO: 6.2 K/UL
WHITE BLOOD CELLS URINE: 41 /HPF

## 2023-12-01 LAB — TSI ACT/NOR SER: <0.1 IU/L

## 2024-01-31 ENCOUNTER — APPOINTMENT (OUTPATIENT)
Dept: FAMILY MEDICINE | Facility: CLINIC | Age: 74
End: 2024-01-31
Payer: MEDICARE

## 2024-01-31 VITALS
SYSTOLIC BLOOD PRESSURE: 110 MMHG | BODY MASS INDEX: 19.84 KG/M2 | HEART RATE: 78 BPM | DIASTOLIC BLOOD PRESSURE: 60 MMHG | OXYGEN SATURATION: 98 % | WEIGHT: 112 LBS | TEMPERATURE: 97.9 F

## 2024-01-31 DIAGNOSIS — R05.9 COUGH, UNSPECIFIED: ICD-10-CM

## 2024-01-31 DIAGNOSIS — J06.9 ACUTE UPPER RESPIRATORY INFECTION, UNSPECIFIED: ICD-10-CM

## 2024-01-31 PROCEDURE — 99213 OFFICE O/P EST LOW 20 MIN: CPT

## 2024-01-31 NOTE — ASSESSMENT
[FreeTextEntry1] : hold azithromycin for now  start flonase and tessalon sanam  if not improved or worse then start azithromycin in 2-3 days  rto if not resolved

## 2024-01-31 NOTE — HISTORY OF PRESENT ILLNESS
[FreeTextEntry8] : several days cough , scratchy throat , , no fever , no ear pain , some congestion , no chest pain , no sob . no gi symptoms

## 2024-01-31 NOTE — PHYSICAL EXAM
[No Acute Distress] : no acute distress [Well Nourished] : well nourished [Well Developed] : well developed [Well-Appearing] : well-appearing [Normal Voice/Communication] : normal voice/communication [Normal Sclera/Conjunctiva] : normal sclera/conjunctiva [Normal Outer Ear/Nose] : the outer ears and nose were normal in appearance [Normal TMs] : both tympanic membranes were normal [No JVD] : no jugular venous distention [No Lymphadenopathy] : no lymphadenopathy [Supple] : supple [No Respiratory Distress] : no respiratory distress  [No Accessory Muscle Use] : no accessory muscle use [Clear to Auscultation] : lungs were clear to auscultation bilaterally [Normal Rate] : normal rate  [Regular Rhythm] : with a regular rhythm [Normal S1, S2] : normal S1 and S2 [No Edema] : there was no peripheral edema [Soft] : abdomen soft [Non Tender] : non-tender [Non-distended] : non-distended [No HSM] : no HSM [No Spinal Tenderness] : no spinal tenderness [Grossly Normal Strength/Tone] : grossly normal strength/tone [No Rash] : no rash [Coordination Grossly Intact] : coordination grossly intact [No Focal Deficits] : no focal deficits [Normal Gait] : normal gait [Speech Grossly Normal] : speech grossly normal [Normal Affect] : the affect was normal [Alert and Oriented x3] : oriented to person, place, and time [Normal Mood] : the mood was normal [Normal Insight/Judgement] : insight and judgment were intact [de-identified] : mild erythema and post nasal drip , no exudate  [de-identified] :  and some cva tenderness on left

## 2024-02-05 ENCOUNTER — APPOINTMENT (OUTPATIENT)
Dept: FAMILY MEDICINE | Facility: CLINIC | Age: 74
End: 2024-02-05

## 2024-03-06 ENCOUNTER — APPOINTMENT (OUTPATIENT)
Dept: FAMILY MEDICINE | Facility: CLINIC | Age: 74
End: 2024-03-06
Payer: MEDICARE

## 2024-03-06 VITALS
HEART RATE: 101 BPM | BODY MASS INDEX: 20.37 KG/M2 | OXYGEN SATURATION: 99 % | SYSTOLIC BLOOD PRESSURE: 120 MMHG | TEMPERATURE: 97.5 F | WEIGHT: 115 LBS | DIASTOLIC BLOOD PRESSURE: 70 MMHG

## 2024-03-06 DIAGNOSIS — R05.3 CHRONIC COUGH: ICD-10-CM

## 2024-03-06 PROCEDURE — 36415 COLL VENOUS BLD VENIPUNCTURE: CPT

## 2024-03-06 PROCEDURE — 99214 OFFICE O/P EST MOD 30 MIN: CPT

## 2024-03-06 PROCEDURE — G2211 COMPLEX E/M VISIT ADD ON: CPT

## 2024-03-06 RX ORDER — AZITHROMYCIN 250 MG/1
250 TABLET, FILM COATED ORAL
Qty: 1 | Refills: 0 | Status: DISCONTINUED | COMMUNITY
Start: 2024-01-31 | End: 2024-03-06

## 2024-03-06 RX ORDER — PEN NEEDLE, DIABETIC 29 G X1/2"
32G X 4 MM NEEDLE, DISPOSABLE MISCELLANEOUS
Qty: 1 | Refills: 3 | Status: ACTIVE | COMMUNITY
Start: 2022-09-30 | End: 1900-01-01

## 2024-03-06 RX ORDER — BENZONATATE 100 MG/1
100 CAPSULE ORAL 3 TIMES DAILY
Qty: 30 | Refills: 0 | Status: DISCONTINUED | COMMUNITY
Start: 2024-01-31 | End: 2024-03-06

## 2024-03-06 RX ORDER — RAMIPRIL 2.5 MG/1
2.5 CAPSULE ORAL DAILY
Qty: 90 | Refills: 0 | Status: DISCONTINUED | COMMUNITY
Start: 2023-10-04 | End: 2024-03-06

## 2024-03-06 RX ORDER — FLUTICASONE PROPIONATE 50 UG/1
50 SPRAY, METERED NASAL
Qty: 1 | Refills: 0 | Status: DISCONTINUED | COMMUNITY
Start: 2024-01-31 | End: 2024-03-06

## 2024-03-06 RX ORDER — ATORVASTATIN CALCIUM 40 MG/1
40 TABLET, FILM COATED ORAL
Qty: 90 | Refills: 1 | Status: DISCONTINUED | COMMUNITY
Start: 2022-11-01 | End: 2024-03-06

## 2024-03-06 NOTE — ASSESSMENT
[FreeTextEntry1] : cough pnd? vs SE for ace - discontinue ace and start arb  if not improved discussed need to see ENT  followup labs

## 2024-03-06 NOTE — HISTORY OF PRESENT ILLNESS
[FreeTextEntry1] : pt here for f/u visit [de-identified] : admits to tickle in throat and cough chronic since january - no other uri symptoms, no gerd  no chest pain, no sob,  no fever, no dizziness, no abdominal pain, no n/v/d/c/melena/brbpr/hematuria/dysuria

## 2024-03-06 NOTE — PHYSICAL EXAM
[No Acute Distress] : no acute distress [Well Nourished] : well nourished [Well Developed] : well developed [Well-Appearing] : well-appearing [Normal Voice/Communication] : normal voice/communication [Normal Sclera/Conjunctiva] : normal sclera/conjunctiva [PERRL] : pupils equal round and reactive to light [Normal Outer Ear/Nose] : the outer ears and nose were normal in appearance [Normal Oropharynx] : the oropharynx was normal [Normal TMs] : both tympanic membranes were normal [No JVD] : no jugular venous distention [No Lymphadenopathy] : no lymphadenopathy [Supple] : supple [No Respiratory Distress] : no respiratory distress  [No Accessory Muscle Use] : no accessory muscle use [Clear to Auscultation] : lungs were clear to auscultation bilaterally [Normal Rate] : normal rate  [Regular Rhythm] : with a regular rhythm [Normal S1, S2] : normal S1 and S2 [No Edema] : there was no peripheral edema [Soft] : abdomen soft [Non Tender] : non-tender [Non-distended] : non-distended [No HSM] : no HSM [Grossly Normal Strength/Tone] : grossly normal strength/tone [No Rash] : no rash [Coordination Grossly Intact] : coordination grossly intact [No Focal Deficits] : no focal deficits [Normal Gait] : normal gait [Speech Grossly Normal] : speech grossly normal [Normal Affect] : the affect was normal [Alert and Oriented x3] : oriented to person, place, and time [Normal Mood] : the mood was normal [Normal Insight/Judgement] : insight and judgment were intact [de-identified] :  and some cva tenderness on left

## 2024-03-07 LAB
ALBUMIN SERPL ELPH-MCNC: 4.2 G/DL
ALP BLD-CCNC: 84 U/L
ALT SERPL-CCNC: 9 U/L
ANION GAP SERPL CALC-SCNC: 14 MMOL/L
APPEARANCE: ABNORMAL
AST SERPL-CCNC: 16 U/L
BACTERIA: NEGATIVE /HPF
BASOPHILS # BLD AUTO: 0.03 K/UL
BASOPHILS NFR BLD AUTO: 0.5 %
BILIRUB SERPL-MCNC: 0.2 MG/DL
BILIRUBIN URINE: NEGATIVE
BLOOD URINE: NEGATIVE
BUN SERPL-MCNC: 21 MG/DL
CALCIUM OXALATE CRYSTALS: PRESENT
CALCIUM SERPL-MCNC: 9.3 MG/DL
CAST: 6 /LPF
CHLORIDE SERPL-SCNC: 102 MMOL/L
CHOLEST SERPL-MCNC: 113 MG/DL
CO2 SERPL-SCNC: 24 MMOL/L
COLOR: YELLOW
CREAT SERPL-MCNC: 0.77 MG/DL
CREAT SPEC-SCNC: 116 MG/DL
CREAT/PROT UR: 0.1 RATIO
EGFR: 81 ML/MIN/1.73M2
EOSINOPHIL # BLD AUTO: 0.28 K/UL
EOSINOPHIL NFR BLD AUTO: 4.6 %
EPITHELIAL CELLS: 1 /HPF
ESTIMATED AVERAGE GLUCOSE: 148 MG/DL
GLUCOSE QUALITATIVE U: 100 MG/DL
GLUCOSE SERPL-MCNC: 160 MG/DL
HBA1C MFR BLD HPLC: 6.8 %
HCT VFR BLD CALC: 37.1 %
HDLC SERPL-MCNC: 39 MG/DL
HGB BLD-MCNC: 11.8 G/DL
IMM GRANULOCYTES NFR BLD AUTO: 0.3 %
INFLUENZA A RESULT: NOT DETECTED
INFLUENZA B RESULT: NOT DETECTED
KETONES URINE: NEGATIVE MG/DL
LDLC SERPL CALC-MCNC: 45 MG/DL
LEUKOCYTE ESTERASE URINE: NEGATIVE
LYMPHOCYTES # BLD AUTO: 1.43 K/UL
LYMPHOCYTES NFR BLD AUTO: 23.3 %
MAN DIFF?: NORMAL
MCHC RBC-ENTMCNC: 29 PG
MCHC RBC-ENTMCNC: 31.8 GM/DL
MCV RBC AUTO: 91.2 FL
MICROSCOPIC-UA: NORMAL
MONOCYTES # BLD AUTO: 0.43 K/UL
MONOCYTES NFR BLD AUTO: 7 %
NEUTROPHILS # BLD AUTO: 3.96 K/UL
NEUTROPHILS NFR BLD AUTO: 64.3 %
NITRITE URINE: NEGATIVE
NONHDLC SERPL-MCNC: 74 MG/DL
PH URINE: 7.5
PLATELET # BLD AUTO: 269 K/UL
POTASSIUM SERPL-SCNC: 4.2 MMOL/L
PROT SERPL-MCNC: 7 G/DL
PROT UR-MCNC: 12 MG/DL
PROTEIN URINE: NEGATIVE MG/DL
RBC # BLD: 4.07 M/UL
RBC # FLD: 13.3 %
RED BLOOD CELLS URINE: 2 /HPF
RESP SYN VIRUS RESULT: NOT DETECTED
REVIEW: NORMAL
SARS-COV-2 RESULT: NOT DETECTED
SODIUM SERPL-SCNC: 141 MMOL/L
SPECIFIC GRAVITY URINE: 1.02
T3 SERPL-MCNC: 118 NG/DL
T3FREE SERPL-MCNC: 2.96 PG/ML
T4 FREE SERPL-MCNC: 1.2 NG/DL
T4 SERPL-MCNC: 6.9 UG/DL
THYROGLOB AB SERPL-ACNC: <20 IU/ML
THYROPEROXIDASE AB SERPL IA-ACNC: 19.3 IU/ML
TRIGL SERPL-MCNC: 178 MG/DL
TSH SERPL-ACNC: 3.38 UIU/ML
UROBILINOGEN URINE: 0.2 MG/DL
WBC # FLD AUTO: 6.15 K/UL
WHITE BLOOD CELLS URINE: 2 /HPF

## 2024-03-11 LAB — TSI ACT/NOR SER: <0.1 IU/L

## 2024-03-13 ENCOUNTER — OUTPATIENT (OUTPATIENT)
Dept: OUTPATIENT SERVICES | Facility: HOSPITAL | Age: 74
LOS: 1 days | End: 2024-03-13
Payer: COMMERCIAL

## 2024-03-13 ENCOUNTER — APPOINTMENT (OUTPATIENT)
Dept: RADIOLOGY | Facility: CLINIC | Age: 74
End: 2024-03-13
Payer: MEDICARE

## 2024-03-13 DIAGNOSIS — Z00.8 ENCOUNTER FOR OTHER GENERAL EXAMINATION: ICD-10-CM

## 2024-03-13 PROCEDURE — 71046 X-RAY EXAM CHEST 2 VIEWS: CPT

## 2024-03-13 PROCEDURE — 71046 X-RAY EXAM CHEST 2 VIEWS: CPT | Mod: 26

## 2024-05-16 ENCOUNTER — RX RENEWAL (OUTPATIENT)
Age: 74
End: 2024-05-16

## 2024-05-16 RX ORDER — SITAGLIPTIN 100 MG/1
100 TABLET, FILM COATED ORAL DAILY
Qty: 1 | Refills: 0 | Status: ACTIVE | COMMUNITY
Start: 2022-06-09 | End: 1900-01-01

## 2024-06-14 ENCOUNTER — APPOINTMENT (OUTPATIENT)
Dept: FAMILY MEDICINE | Facility: CLINIC | Age: 74
End: 2024-06-14
Payer: MEDICARE

## 2024-06-14 VITALS
SYSTOLIC BLOOD PRESSURE: 116 MMHG | OXYGEN SATURATION: 97 % | HEART RATE: 100 BPM | WEIGHT: 115 LBS | TEMPERATURE: 97.3 F | DIASTOLIC BLOOD PRESSURE: 70 MMHG | BODY MASS INDEX: 20.37 KG/M2

## 2024-06-14 DIAGNOSIS — E05.90 THYROTOXICOSIS, UNSPECIFIED W/OUT THYROTOXIC CRISIS OR STORM: ICD-10-CM

## 2024-06-14 DIAGNOSIS — E11.9 TYPE 2 DIABETES MELLITUS W/OUT COMPLICATIONS: ICD-10-CM

## 2024-06-14 DIAGNOSIS — Z13.820 ENCOUNTER FOR SCREENING FOR OSTEOPOROSIS: ICD-10-CM

## 2024-06-14 DIAGNOSIS — E78.00 PURE HYPERCHOLESTEROLEMIA, UNSPECIFIED: ICD-10-CM

## 2024-06-14 PROCEDURE — 99214 OFFICE O/P EST MOD 30 MIN: CPT

## 2024-06-14 PROCEDURE — G2211 COMPLEX E/M VISIT ADD ON: CPT

## 2024-06-14 RX ORDER — ATORVASTATIN CALCIUM 40 MG/1
40 TABLET, FILM COATED ORAL
Qty: 90 | Refills: 1 | Status: ACTIVE | COMMUNITY
Start: 2022-11-10 | End: 1900-01-01

## 2024-06-14 RX ORDER — INSULIN GLARGINE 100 [IU]/ML
100 INJECTION, SOLUTION SUBCUTANEOUS
Qty: 3 | Refills: 0 | Status: ACTIVE | COMMUNITY
Start: 2024-06-14 | End: 1900-01-01

## 2024-06-14 NOTE — PHYSICAL EXAM
[No Acute Distress] : no acute distress [Well Nourished] : well nourished [Well Developed] : well developed [Well-Appearing] : well-appearing [Normal Voice/Communication] : normal voice/communication [Normal Sclera/Conjunctiva] : normal sclera/conjunctiva [Normal Outer Ear/Nose] : the outer ears and nose were normal in appearance [No JVD] : no jugular venous distention [No Respiratory Distress] : no respiratory distress  [No Accessory Muscle Use] : no accessory muscle use [Clear to Auscultation] : lungs were clear to auscultation bilaterally [Normal Rate] : normal rate  [Regular Rhythm] : with a regular rhythm [Normal S1, S2] : normal S1 and S2 [No Edema] : there was no peripheral edema [Soft] : abdomen soft [Non Tender] : non-tender [Non-distended] : non-distended [No HSM] : no HSM [Grossly Normal Strength/Tone] : grossly normal strength/tone [No Rash] : no rash [Coordination Grossly Intact] : coordination grossly intact [No Focal Deficits] : no focal deficits [Normal Gait] : normal gait [Speech Grossly Normal] : speech grossly normal [Normal Affect] : the affect was normal [Alert and Oriented x3] : oriented to person, place, and time [Normal Mood] : the mood was normal [Normal Insight/Judgement] : insight and judgment were intact [de-identified] :  and some cva tenderness on left

## 2024-06-14 NOTE — HISTORY OF PRESENT ILLNESS
[FreeTextEntry1] : pt here for f/u visit [de-identified] : no chest pain, no sob, no cough, no fever, no dizziness, no abdominal pain, no n/v/d/c/melena/brbpr/hematuria/dysuria

## 2024-06-14 NOTE — ASSESSMENT
[FreeTextEntry1] : would like alternative for levemir states may not be covered - discussed sent lantus equivalent  followup labs  refused mammo  agree to dexa

## 2024-06-17 ENCOUNTER — RX RENEWAL (OUTPATIENT)
Age: 74
End: 2024-06-17

## 2024-06-17 RX ORDER — METFORMIN HYDROCHLORIDE 1000 MG/1
1000 TABLET, COATED ORAL
Qty: 60 | Refills: 10 | Status: ACTIVE | COMMUNITY
Start: 2022-10-06 | End: 1900-01-01

## 2024-06-19 ENCOUNTER — APPOINTMENT (OUTPATIENT)
Dept: PULMONOLOGY | Facility: CLINIC | Age: 74
End: 2024-06-19

## 2024-06-25 RX ORDER — LOSARTAN POTASSIUM 25 MG/1
25 TABLET, FILM COATED ORAL
Qty: 90 | Refills: 0 | Status: ACTIVE | COMMUNITY
Start: 2024-03-06 | End: 1900-01-01

## 2024-06-25 RX ORDER — INSULIN DETEMIR 100 [IU]/ML
100 INJECTION, SOLUTION SUBCUTANEOUS
Qty: 3 | Refills: 0 | Status: ACTIVE | COMMUNITY
Start: 2022-11-23 | End: 1900-01-01

## 2024-08-06 ENCOUNTER — APPOINTMENT (OUTPATIENT)
Dept: INTERNAL MEDICINE | Facility: CLINIC | Age: 74
End: 2024-08-06

## 2024-08-06 PROBLEM — Z11.1 TUBERCULOSIS SCREENING: Status: ACTIVE | Noted: 2024-08-06

## 2024-08-06 PROBLEM — Z78.9 NON-SMOKER: Status: ACTIVE | Noted: 2024-08-06

## 2024-08-06 PROCEDURE — 99214 OFFICE O/P EST MOD 30 MIN: CPT

## 2024-08-06 NOTE — HISTORY OF PRESENT ILLNESS
[FreeTextEntry1] : forms   [de-identified] : followup  no chest pain, no sob, no cough, no fever, no dizziness, no abdominal pain, no n/v/d/c/melena/brbpr/hematuria/dysuria

## 2024-08-06 NOTE — PHYSICAL EXAM
[No Acute Distress] : no acute distress [Well Nourished] : well nourished [Well Developed] : well developed [Well-Appearing] : well-appearing [Normal Voice/Communication] : normal voice/communication [Normal Sclera/Conjunctiva] : normal sclera/conjunctiva [Normal Outer Ear/Nose] : the outer ears and nose were normal in appearance [No JVD] : no jugular venous distention [No Respiratory Distress] : no respiratory distress  [No Accessory Muscle Use] : no accessory muscle use [Clear to Auscultation] : lungs were clear to auscultation bilaterally [Normal Rate] : normal rate  [Regular Rhythm] : with a regular rhythm [Normal S1, S2] : normal S1 and S2 [No Edema] : there was no peripheral edema [Soft] : abdomen soft [Non Tender] : non-tender [Non-distended] : non-distended [No HSM] : no HSM [Grossly Normal Strength/Tone] : grossly normal strength/tone [No Rash] : no rash [Coordination Grossly Intact] : coordination grossly intact [No Focal Deficits] : no focal deficits [Normal Gait] : normal gait [Speech Grossly Normal] : speech grossly normal [Normal Affect] : the affect was normal [Alert and Oriented x3] : oriented to person, place, and time [Normal Mood] : the mood was normal [Normal Insight/Judgement] : insight and judgment were intact [de-identified] :  and some cva tenderness on left

## 2024-08-22 ENCOUNTER — RX RENEWAL (OUTPATIENT)
Age: 74
End: 2024-08-22

## 2024-10-01 RX ORDER — BLOOD SUGAR DIAGNOSTIC
STRIP MISCELLANEOUS
Qty: 100 | Refills: 1 | Status: ACTIVE | COMMUNITY
Start: 2024-10-01 | End: 1900-01-01

## 2024-10-01 RX ORDER — LANCETS
EACH MISCELLANEOUS
Qty: 1 | Refills: 3 | Status: ACTIVE | COMMUNITY
Start: 2024-10-01 | End: 1900-01-01

## 2024-10-01 RX ORDER — BLOOD-GLUCOSE METER
W/DEVICE EACH MISCELLANEOUS
Qty: 1 | Refills: 0 | Status: ACTIVE | COMMUNITY
Start: 2024-10-01 | End: 1900-01-01

## 2024-11-13 ENCOUNTER — RX RENEWAL (OUTPATIENT)
Age: 74
End: 2024-11-13

## 2025-02-13 ENCOUNTER — RX RENEWAL (OUTPATIENT)
Age: 75
End: 2025-02-13

## 2025-03-20 ENCOUNTER — APPOINTMENT (OUTPATIENT)
Dept: PULMONOLOGY | Facility: CLINIC | Age: 75
End: 2025-03-20
Payer: MEDICARE

## 2025-03-20 VITALS
OXYGEN SATURATION: 97 % | BODY MASS INDEX: 20.91 KG/M2 | DIASTOLIC BLOOD PRESSURE: 84 MMHG | WEIGHT: 118 LBS | SYSTOLIC BLOOD PRESSURE: 126 MMHG | HEIGHT: 63 IN | HEART RATE: 80 BPM | RESPIRATION RATE: 16 BRPM

## 2025-03-20 DIAGNOSIS — J31.0 CHRONIC RHINITIS: ICD-10-CM

## 2025-03-20 DIAGNOSIS — R05.9 COUGH, UNSPECIFIED: ICD-10-CM

## 2025-03-20 PROCEDURE — 94010 BREATHING CAPACITY TEST: CPT

## 2025-03-20 PROCEDURE — 99204 OFFICE O/P NEW MOD 45 MIN: CPT | Mod: 25

## 2025-03-20 RX ORDER — FLUTICASONE PROPIONATE 50 UG/1
50 SPRAY, METERED NASAL DAILY
Qty: 1 | Refills: 5 | Status: ACTIVE | COMMUNITY
Start: 2025-03-20 | End: 1900-01-01

## 2025-03-25 ENCOUNTER — OUTPATIENT (OUTPATIENT)
Dept: OUTPATIENT SERVICES | Facility: HOSPITAL | Age: 75
LOS: 1 days | End: 2025-03-25
Payer: MEDICARE

## 2025-03-25 ENCOUNTER — APPOINTMENT (OUTPATIENT)
Dept: RADIOLOGY | Facility: CLINIC | Age: 75
End: 2025-03-25
Payer: MEDICARE

## 2025-03-25 ENCOUNTER — APPOINTMENT (OUTPATIENT)
Dept: INTERNAL MEDICINE | Facility: CLINIC | Age: 75
End: 2025-03-25
Payer: MEDICARE

## 2025-03-25 ENCOUNTER — NON-APPOINTMENT (OUTPATIENT)
Age: 75
End: 2025-03-25

## 2025-03-25 VITALS
DIASTOLIC BLOOD PRESSURE: 70 MMHG | TEMPERATURE: 97 F | SYSTOLIC BLOOD PRESSURE: 120 MMHG | HEART RATE: 83 BPM | BODY MASS INDEX: 21.61 KG/M2 | OXYGEN SATURATION: 98 % | WEIGHT: 122 LBS

## 2025-03-25 DIAGNOSIS — R94.31 ABNORMAL ELECTROCARDIOGRAM [ECG] [EKG]: ICD-10-CM

## 2025-03-25 DIAGNOSIS — Z11.59 ENCOUNTER FOR SCREENING FOR OTHER VIRAL DISEASES: ICD-10-CM

## 2025-03-25 DIAGNOSIS — E05.90 THYROTOXICOSIS, UNSPECIFIED W/OUT THYROTOXIC CRISIS OR STORM: ICD-10-CM

## 2025-03-25 DIAGNOSIS — Z13.820 ENCOUNTER FOR SCREENING FOR OSTEOPOROSIS: ICD-10-CM

## 2025-03-25 DIAGNOSIS — Z23 ENCOUNTER FOR IMMUNIZATION: ICD-10-CM

## 2025-03-25 DIAGNOSIS — R05.9 COUGH, UNSPECIFIED: ICD-10-CM

## 2025-03-25 DIAGNOSIS — Z12.11 ENCOUNTER FOR SCREENING FOR MALIGNANT NEOPLASM OF COLON: ICD-10-CM

## 2025-03-25 DIAGNOSIS — E11.9 TYPE 2 DIABETES MELLITUS W/OUT COMPLICATIONS: ICD-10-CM

## 2025-03-25 DIAGNOSIS — Z00.00 ENCOUNTER FOR GENERAL ADULT MEDICAL EXAMINATION W/OUT ABNORMAL FINDINGS: ICD-10-CM

## 2025-03-25 DIAGNOSIS — E78.00 PURE HYPERCHOLESTEROLEMIA, UNSPECIFIED: ICD-10-CM

## 2025-03-25 PROCEDURE — 77085 DXA BONE DENSITY AXL VRT FX: CPT

## 2025-03-25 PROCEDURE — 90677 PCV20 VACCINE IM: CPT

## 2025-03-25 PROCEDURE — 77085 DXA BONE DENSITY AXL VRT FX: CPT | Mod: 26

## 2025-03-25 PROCEDURE — 71046 X-RAY EXAM CHEST 2 VIEWS: CPT

## 2025-03-25 PROCEDURE — 71046 X-RAY EXAM CHEST 2 VIEWS: CPT | Mod: 26

## 2025-03-25 PROCEDURE — G0009: CPT

## 2025-03-25 PROCEDURE — G0439: CPT

## 2025-03-25 PROCEDURE — 93000 ELECTROCARDIOGRAM COMPLETE: CPT

## 2025-03-31 LAB
25(OH)D3 SERPL-MCNC: 78.9 NG/ML
ALBUMIN SERPL ELPH-MCNC: 4.4 G/DL
ALP BLD-CCNC: 75 U/L
ALT SERPL-CCNC: 14 U/L
ANION GAP SERPL CALC-SCNC: 12 MMOL/L
APPEARANCE: CLEAR
AST SERPL-CCNC: 18 U/L
BACTERIA: NEGATIVE /HPF
BASOPHILS # BLD AUTO: 0.04 K/UL
BASOPHILS NFR BLD AUTO: 0.7 %
BILIRUB SERPL-MCNC: 0.4 MG/DL
BILIRUBIN URINE: NEGATIVE
BLOOD URINE: NEGATIVE
BUN SERPL-MCNC: 13 MG/DL
CALCIUM SERPL-MCNC: 9.9 MG/DL
CAST: 1 /LPF
CHLORIDE SERPL-SCNC: 103 MMOL/L
CHOLEST SERPL-MCNC: 137 MG/DL
CO2 SERPL-SCNC: 27 MMOL/L
COLOR: YELLOW
CREAT SERPL-MCNC: 1.01 MG/DL
EGFRCR SERPLBLD CKD-EPI 2021: 58 ML/MIN/1.73M2
EOSINOPHIL # BLD AUTO: 0.23 K/UL
EOSINOPHIL NFR BLD AUTO: 4.1 %
EPITHELIAL CELLS: 0 /HPF
ESTIMATED AVERAGE GLUCOSE: 203 MG/DL
GLUCOSE QUALITATIVE U: 250 MG/DL
GLUCOSE SERPL-MCNC: 170 MG/DL
HBA1C MFR BLD HPLC: 8.7 %
HBV SURFACE AB SER QL: NONREACTIVE
HCT VFR BLD CALC: 38.1 %
HDLC SERPL-MCNC: 46 MG/DL
HGB BLD-MCNC: 12.4 G/DL
IMM GRANULOCYTES NFR BLD AUTO: 0.2 %
KETONES URINE: NEGATIVE MG/DL
LDLC SERPL-MCNC: 68 MG/DL
LEUKOCYTE ESTERASE URINE: ABNORMAL
LYMPHOCYTES # BLD AUTO: 1.55 K/UL
LYMPHOCYTES NFR BLD AUTO: 27.3 %
M TB IFN-G BLD-IMP: NEGATIVE
MAN DIFF?: NORMAL
MCHC RBC-ENTMCNC: 28.8 PG
MCHC RBC-ENTMCNC: 32.5 G/DL
MCV RBC AUTO: 88.4 FL
MEV IGG FLD QL IA: >300 AU/ML
MEV IGG+IGM SER-IMP: POSITIVE
MICROSCOPIC-UA: NORMAL
MONOCYTES # BLD AUTO: 0.53 K/UL
MONOCYTES NFR BLD AUTO: 9.3 %
MUV AB SER-ACNC: POSITIVE
MUV IGG SER QL IA: 39.8 AU/ML
NEUTROPHILS # BLD AUTO: 3.31 K/UL
NEUTROPHILS NFR BLD AUTO: 58.4 %
NITRITE URINE: NEGATIVE
NONHDLC SERPL-MCNC: 91 MG/DL
PH URINE: 6
PLATELET # BLD AUTO: 253 K/UL
POTASSIUM SERPL-SCNC: 4.2 MMOL/L
PROT SERPL-MCNC: 7.1 G/DL
PROTEIN URINE: NEGATIVE MG/DL
QUANTIFERON TB PLUS MITOGEN MINUS NIL: 2.9 IU/ML
QUANTIFERON TB PLUS NIL: 0.15 IU/ML
QUANTIFERON TB PLUS TB1 MINUS NIL: 0 IU/ML
QUANTIFERON TB PLUS TB2 MINUS NIL: 0.04 IU/ML
RBC # BLD: 4.31 M/UL
RBC # FLD: 12.6 %
RED BLOOD CELLS URINE: 0 /HPF
RUBV IGG FLD-ACNC: 6.01 INDEX
RUBV IGG SER-IMP: POSITIVE
SODIUM SERPL-SCNC: 141 MMOL/L
SPECIFIC GRAVITY URINE: 1.01
TRIGL SERPL-MCNC: 126 MG/DL
TSH SERPL-ACNC: 0.37 UIU/ML
UROBILINOGEN URINE: 0.2 MG/DL
VZV AB TITR SER: POSITIVE
VZV IGG SER IF-ACNC: 36.2 S/CO
WBC # FLD AUTO: 5.67 K/UL
WHITE BLOOD CELLS URINE: 1 /HPF

## 2025-04-11 ENCOUNTER — APPOINTMENT (OUTPATIENT)
Dept: INTERNAL MEDICINE | Facility: CLINIC | Age: 75
End: 2025-04-11
Payer: MEDICARE

## 2025-04-11 VITALS
SYSTOLIC BLOOD PRESSURE: 130 MMHG | HEART RATE: 91 BPM | TEMPERATURE: 97.3 F | DIASTOLIC BLOOD PRESSURE: 68 MMHG | WEIGHT: 119 LBS | OXYGEN SATURATION: 98 % | BODY MASS INDEX: 21.08 KG/M2

## 2025-04-11 DIAGNOSIS — Z23 ENCOUNTER FOR IMMUNIZATION: ICD-10-CM

## 2025-04-11 DIAGNOSIS — E11.9 TYPE 2 DIABETES MELLITUS W/OUT COMPLICATIONS: ICD-10-CM

## 2025-04-11 PROCEDURE — G0010: CPT

## 2025-04-11 PROCEDURE — 90739 HEPB VACC 2/4 DOSE ADULT IM: CPT

## 2025-04-11 PROCEDURE — 99213 OFFICE O/P EST LOW 20 MIN: CPT

## 2025-04-11 PROCEDURE — G2211 COMPLEX E/M VISIT ADD ON: CPT

## 2025-04-14 ENCOUNTER — RX RENEWAL (OUTPATIENT)
Age: 75
End: 2025-04-14

## 2025-04-18 ENCOUNTER — APPOINTMENT (OUTPATIENT)
Dept: CARDIOLOGY | Facility: CLINIC | Age: 75
End: 2025-04-18

## 2025-05-13 ENCOUNTER — RX RENEWAL (OUTPATIENT)
Age: 75
End: 2025-05-13

## 2025-05-23 ENCOUNTER — APPOINTMENT (OUTPATIENT)
Dept: INTERNAL MEDICINE | Facility: CLINIC | Age: 75
End: 2025-05-23

## 2025-07-11 ENCOUNTER — APPOINTMENT (OUTPATIENT)
Dept: INTERNAL MEDICINE | Facility: CLINIC | Age: 75
End: 2025-07-11

## 2025-07-15 ENCOUNTER — APPOINTMENT (OUTPATIENT)
Dept: PULMONOLOGY | Facility: CLINIC | Age: 75
End: 2025-07-15

## 2025-07-15 ENCOUNTER — RX RENEWAL (OUTPATIENT)
Age: 75
End: 2025-07-15

## 2025-07-17 ENCOUNTER — APPOINTMENT (OUTPATIENT)
Dept: INTERNAL MEDICINE | Facility: CLINIC | Age: 75
End: 2025-07-17
Payer: MEDICARE

## 2025-07-17 VITALS
BODY MASS INDEX: 22.15 KG/M2 | WEIGHT: 125 LBS | TEMPERATURE: 96.8 F | HEIGHT: 63 IN | OXYGEN SATURATION: 96 % | SYSTOLIC BLOOD PRESSURE: 126 MMHG | HEART RATE: 93 BPM | DIASTOLIC BLOOD PRESSURE: 64 MMHG

## 2025-07-17 PROBLEM — Z78.9 EXERCISES RARELY: Status: ACTIVE | Noted: 2025-07-17

## 2025-07-17 PROBLEM — Z60.2 LIVES ALONE: Status: ACTIVE | Noted: 2025-07-17

## 2025-07-17 PROCEDURE — 99214 OFFICE O/P EST MOD 30 MIN: CPT

## 2025-07-17 PROCEDURE — G2211 COMPLEX E/M VISIT ADD ON: CPT

## 2025-07-17 RX ORDER — LANCETS
EACH MISCELLANEOUS
Qty: 100 | Refills: 4 | Status: ACTIVE | COMMUNITY
Start: 2025-07-17 | End: 1900-01-01

## 2025-07-17 RX ORDER — BLOOD SUGAR DIAGNOSTIC
STRIP MISCELLANEOUS
Qty: 200 | Refills: 0 | Status: ACTIVE | COMMUNITY
Start: 2025-07-17 | End: 1900-01-01

## 2025-07-30 ENCOUNTER — OUTPATIENT (OUTPATIENT)
Dept: OUTPATIENT SERVICES | Facility: HOSPITAL | Age: 75
LOS: 1 days | End: 2025-07-30
Payer: MEDICARE

## 2025-07-30 ENCOUNTER — LABORATORY RESULT (OUTPATIENT)
Age: 75
End: 2025-07-30

## 2025-07-30 ENCOUNTER — APPOINTMENT (OUTPATIENT)
Dept: ULTRASOUND IMAGING | Facility: CLINIC | Age: 75
End: 2025-07-30
Payer: MEDICARE

## 2025-07-30 DIAGNOSIS — E05.90 THYROTOXICOSIS, UNSPECIFIED WITHOUT THYROTOXIC CRISIS OR STORM: ICD-10-CM

## 2025-07-30 PROCEDURE — 76536 US EXAM OF HEAD AND NECK: CPT

## 2025-07-30 PROCEDURE — 76536 US EXAM OF HEAD AND NECK: CPT | Mod: 26

## 2025-08-04 RX ORDER — INSULIN GLARGINE 100 [IU]/ML
100 INJECTION, SOLUTION SUBCUTANEOUS
Qty: 1 | Refills: 1 | Status: ACTIVE | COMMUNITY
Start: 2025-08-04 | End: 1900-01-01

## 2025-08-07 RX ORDER — BLOOD SUGAR DIAGNOSTIC
STRIP MISCELLANEOUS TWICE DAILY
Qty: 200 | Refills: 0 | Status: ACTIVE | COMMUNITY
Start: 2025-08-04 | End: 1900-01-01

## 2025-08-12 ENCOUNTER — RX RENEWAL (OUTPATIENT)
Age: 75
End: 2025-08-12

## 2025-08-13 RX ORDER — BLOOD SUGAR DIAGNOSTIC
STRIP MISCELLANEOUS DAILY
Qty: 1 | Refills: 0 | Status: ACTIVE | COMMUNITY
Start: 2025-08-12 | End: 1900-01-01

## 2025-09-02 ENCOUNTER — APPOINTMENT (OUTPATIENT)
Dept: ENDOCRINOLOGY | Facility: CLINIC | Age: 75
End: 2025-09-02
Payer: MEDICARE

## 2025-09-02 PROCEDURE — G0108 DIAB MANAGE TRN  PER INDIV: CPT | Mod: 2W

## 2025-09-09 ENCOUNTER — LABORATORY RESULT (OUTPATIENT)
Age: 75
End: 2025-09-09

## 2025-09-09 ENCOUNTER — APPOINTMENT (OUTPATIENT)
Dept: INTERNAL MEDICINE | Facility: CLINIC | Age: 75
End: 2025-09-09
Payer: MEDICARE

## 2025-09-09 VITALS
OXYGEN SATURATION: 98 % | HEART RATE: 92 BPM | WEIGHT: 123 LBS | HEIGHT: 63 IN | BODY MASS INDEX: 21.79 KG/M2 | TEMPERATURE: 97.5 F | SYSTOLIC BLOOD PRESSURE: 126 MMHG | DIASTOLIC BLOOD PRESSURE: 60 MMHG

## 2025-09-09 DIAGNOSIS — E11.9 TYPE 2 DIABETES MELLITUS W/OUT COMPLICATIONS: ICD-10-CM

## 2025-09-09 DIAGNOSIS — E05.90 THYROTOXICOSIS, UNSPECIFIED W/OUT THYROTOXIC CRISIS OR STORM: ICD-10-CM

## 2025-09-09 PROCEDURE — 99214 OFFICE O/P EST MOD 30 MIN: CPT

## 2025-09-09 PROCEDURE — 36415 COLL VENOUS BLD VENIPUNCTURE: CPT

## 2025-09-09 PROCEDURE — G2211 COMPLEX E/M VISIT ADD ON: CPT

## 2025-09-18 LAB
T3FREE SERPL-MCNC: 3.4 PG/ML
T3RU NFR SERPL: 1 TBI
T4 FREE SERPL-MCNC: 1.5 NG/DL
T4 SERPL-MCNC: 8.1 UG/DL
THYROGLOB AB SERPL-ACNC: 15.7 IU/ML
THYROPEROXIDASE AB SERPL IA-ACNC: 17.6 IU/ML
TSH SERPL-ACNC: 0.16 UIU/ML
TSI ACT/NOR SER: <0.1 IU/L